# Patient Record
Sex: FEMALE | Race: WHITE | NOT HISPANIC OR LATINO | URBAN - METROPOLITAN AREA
[De-identification: names, ages, dates, MRNs, and addresses within clinical notes are randomized per-mention and may not be internally consistent; named-entity substitution may affect disease eponyms.]

---

## 2017-10-15 ENCOUNTER — OFFICE VISIT (OUTPATIENT)
Dept: URGENT CARE | Facility: CLINIC | Age: 52
End: 2017-10-15
Payer: COMMERCIAL

## 2017-10-15 ENCOUNTER — APPOINTMENT (OUTPATIENT)
Dept: RADIOLOGY | Facility: IMAGING CENTER | Age: 52
End: 2017-10-15
Attending: NURSE PRACTITIONER
Payer: COMMERCIAL

## 2017-10-15 VITALS
HEART RATE: 64 BPM | WEIGHT: 190 LBS | OXYGEN SATURATION: 97 % | TEMPERATURE: 98.5 F | RESPIRATION RATE: 16 BRPM | BODY MASS INDEX: 30.53 KG/M2 | SYSTOLIC BLOOD PRESSURE: 118 MMHG | DIASTOLIC BLOOD PRESSURE: 74 MMHG | HEIGHT: 66 IN

## 2017-10-15 DIAGNOSIS — M79.645 PAIN OF LEFT MIDDLE FINGER: ICD-10-CM

## 2017-10-15 DIAGNOSIS — L03.012 CELLULITIS OF FINGER OF LEFT HAND: ICD-10-CM

## 2017-10-15 PROCEDURE — 73140 X-RAY EXAM OF FINGER(S): CPT | Mod: TC,LT | Performed by: NURSE PRACTITIONER

## 2017-10-15 PROCEDURE — 99204 OFFICE O/P NEW MOD 45 MIN: CPT | Performed by: NURSE PRACTITIONER

## 2017-10-15 RX ORDER — DOXYCYCLINE HYCLATE 150 MG/1
TABLET ORAL
COMMUNITY
End: 2018-07-03

## 2017-10-15 RX ORDER — CLINDAMYCIN HYDROCHLORIDE 300 MG/1
300 CAPSULE ORAL 3 TIMES DAILY
Qty: 21 CAP | Refills: 0 | Status: SHIPPED | OUTPATIENT
Start: 2017-10-15 | End: 2017-10-22

## 2017-10-16 NOTE — PROGRESS NOTES
"Subjective:      Sofy Fay is a 52 y.o. female who presents with Wound Infection ((L) midde finger, pt states she is having pain in her hand and radiating up her arm into armpit)            This is a new problem. She states she smashed her left middle finger several months ago and had pain and swelling at that time. The was also broken at the time, scabbed over but never fully healed. Now she reports hitting her finger again recently and now the pain is worse. She also states she has pain that radiates up her left arm and her left arm pit feels tender. She feels body aches recently after hitting finger again and is worried it may be infected because of the way it looks. She has only tried Ibuprofen for the pain with little relief.        Review of Systems   Musculoskeletal: Positive for joint pain (left middle finger).   All other systems reviewed and are negative.    Past Medical History:   Diagnosis Date   • Obesity 8/8/2012   • History of cervical cancer 8/8/2012   • Anxiety 8/8/2012      Past Surgical History:   Procedure Laterality Date   • ABDOMINAL HYSTERECTOMY TOTAL        Social History     Social History   • Marital status:      Spouse name: N/A   • Number of children: N/A   • Years of education: N/A     Occupational History   • Not on file.     Social History Main Topics   • Smoking status: Never Smoker   • Smokeless tobacco: Never Used   • Alcohol use 1.8 oz/week     3 Shots of liquor per week   • Drug use: No   • Sexual activity: Yes     Partners: Male     Birth control/ protection: Surgical     Other Topics Concern   • Not on file     Social History Narrative   • No narrative on file          Objective:     /74   Pulse 64   Temp 36.9 °C (98.5 °F)   Resp 16   Ht 1.676 m (5' 6\")   Wt 86.2 kg (190 lb)   SpO2 97%   BMI 30.67 kg/m²      Physical Exam   Constitutional: She is oriented to person, place, and time. Vital signs are normal. She appears well-developed and " well-nourished.   HENT:   Head: Normocephalic and atraumatic.   Eyes: EOM are normal. Pupils are equal, round, and reactive to light.   Neck: Normal range of motion.   Cardiovascular: Normal rate and regular rhythm.    Pulmonary/Chest: Effort normal.   Musculoskeletal:        Left hand: She exhibits decreased range of motion and swelling.        Hands:  Neurological: She is alert and oriented to person, place, and time.   Skin: Skin is warm and dry. Capillary refill takes less than 2 seconds.   Psychiatric: She has a normal mood and affect. Her speech is normal and behavior is normal. Thought content normal.   Vitals reviewed.         Xray: no fracture or dislocation by my read. Radiology review pending.  10/15/2017 4:31 PM    HISTORY/REASON FOR EXAM:  Left 3rd digit pain after impaction injury 8 months ago.      TECHNIQUE/EXAM DESCRIPTION AND NUMBER OF VIEWS:  3 views of the LEFT fingers.    COMPARISON: None    FINDINGS:  No acute fracture, dislocation, or joint effusion is identified.     Impression       Negative left 3rd finger series.          Assessment/Plan:     1. Pain of left middle finger  - DX-FINGER(S) 2+ LEFT; Future    2. Cellulitis of finger of left hand  - clindamycin (CLEOCIN) 300 MG Cap; Take 1 Cap by mouth 3 times a day for 7 days.  Dispense: 21 Cap; Refill: 0    Will trial on a course of ABX to see if any improvement  The lymph node discomfort she is likely experiencing may be reactionary in nature  RTC if s/s do not improve  Strict ER precautions for fever, increasing redness/pain, or new drainage  Supportive care, differential diagnoses, and indications for immediate follow-up discussed with patient.    Pathogenesis of diagnosis discussed including typical length and natural progression.    Instructed to return to  or nearest emergency department if symptoms fail to improve, for any change in condition, further concerns, or new concerning symptoms.  Patient states understanding of the plan of  care and discharge instructions.

## 2017-11-03 ENCOUNTER — OFFICE VISIT (OUTPATIENT)
Dept: URGENT CARE | Facility: CLINIC | Age: 52
End: 2017-11-03
Payer: COMMERCIAL

## 2017-11-03 ENCOUNTER — APPOINTMENT (OUTPATIENT)
Dept: RADIOLOGY | Facility: IMAGING CENTER | Age: 52
End: 2017-11-03
Attending: NURSE PRACTITIONER
Payer: COMMERCIAL

## 2017-11-03 VITALS
WEIGHT: 211 LBS | OXYGEN SATURATION: 96 % | TEMPERATURE: 98 F | SYSTOLIC BLOOD PRESSURE: 110 MMHG | DIASTOLIC BLOOD PRESSURE: 80 MMHG | BODY MASS INDEX: 33.91 KG/M2 | RESPIRATION RATE: 16 BRPM | HEIGHT: 66 IN | HEART RATE: 71 BPM

## 2017-11-03 DIAGNOSIS — M25.561 ARTHRALGIA OF RIGHT KNEE: ICD-10-CM

## 2017-11-03 DIAGNOSIS — M25.561 RIGHT KNEE PAIN, UNSPECIFIED CHRONICITY: ICD-10-CM

## 2017-11-03 PROCEDURE — 99214 OFFICE O/P EST MOD 30 MIN: CPT | Performed by: NURSE PRACTITIONER

## 2017-11-03 PROCEDURE — 73562 X-RAY EXAM OF KNEE 3: CPT | Mod: TC,RT | Performed by: NURSE PRACTITIONER

## 2017-11-03 ASSESSMENT — ENCOUNTER SYMPTOMS: JOINT SWELLING: 1

## 2017-11-03 NOTE — PROGRESS NOTES
Subjective:      Sofy Fay is a 52 y.o. female who presents with Knee Pain (x1 week/ right knee/ tightness)            Knee Pain   This is a new problem. Episode onset: Pt reports she had an ACL repair with a cadaver ligament about 15 years ago. 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 She has noticed after her skit on the hard concrete surface seems hurt her knee. The problem occurs intermittently. The problem has been gradually worsening (Everyday that she has been performing in the play seems to make her knee tight and stiff in the evenings with an associated deep ache). Associated symptoms include joint swelling (right knee). Associated symptoms comments: She is worried her cadaver ligament may have failed because it has never bothered her before. The symptoms are aggravated by standing and walking. She has tried ice (elevation) for the symptoms. The treatment provided no relief.       Review of Systems   Musculoskeletal: Positive for joint pain (right knee) and joint swelling (right knee).   All other systems reviewed and are negative.    Past Medical History:   Diagnosis Date   • Anxiety 8/8/2012   • History of cervical cancer 8/8/2012   • Obesity 8/8/2012      Past Surgical History:   Procedure Laterality Date   • ABDOMINAL HYSTERECTOMY TOTAL        Social History     Social History   • Marital status:      Spouse name: N/A   • Number of children: N/A   • Years of education: N/A     Occupational History   • Not on file.     Social History Main Topics   • Smoking status: Never Smoker   • Smokeless tobacco: Never Used   • Alcohol use 1.8 oz/week     3 Shots of liquor per week   • Drug use: No   • Sexual activity: Yes     Partners: Male     Birth control/ protection: Surgical     Other Topics Concern   • Not on file     Social History Narrative   • No narrative on file          Objective:     /80   Pulse 71   Temp 36.7 °C (98 °F)   Resp 16   " Ht 1.676 m (5' 6\")   Wt 95.7 kg (211 lb)   SpO2 96%   BMI 34.06 kg/m²      Physical Exam   Constitutional: She is oriented to person, place, and time. Vital signs are normal. She appears well-developed and well-nourished.   HENT:   Head: Normocephalic and atraumatic.   Eyes: EOM are normal. Pupils are equal, round, and reactive to light.   Neck: Normal range of motion.   Cardiovascular: Normal rate and regular rhythm.    Pulmonary/Chest: Effort normal.   Musculoskeletal:        Right knee: She exhibits decreased range of motion and swelling. She exhibits no bony tenderness and normal meniscus. Tenderness found.        Legs:  Neurological: She is alert and oriented to person, place, and time.   Skin: Skin is warm and dry. Capillary refill takes less than 2 seconds.   Psychiatric: She has a normal mood and affect. Her speech is normal and behavior is normal. Thought content normal.   Vitals reviewed.         Xray: no fracture or dislocation by my read. Radiology review pending.  11/3/2017 2:23 PM    HISTORY/REASON FOR EXAM:  Atraumatic Pain/Swelling/Deformity.      TECHNIQUE/EXAM DESCRIPTION AND NUMBER OF VIEWS:  3 views of the  RIGHT knee.    COMPARISON: None    FINDINGS:    MINERALIZATION: Mineralization is unremarkable for age.    INJURY: No acute fracture or gross malalignment is seen.    MEDIAL JOINT COMPARTMENT: There is mild loss of joint space.  LATERAL JOINT COMPARTMENT: There is moderate loss of joint space.  PATELLOFEMORAL JOINT COMPARTMENT: There is mild loss of joint space.  There is a small knee joint effusion. There is a 12 x 11 x 16 mm partially calcified joint body in the suprapatellar joint recess.    There is ligamentous reconstruction hardware in the distal femur and proximal tibia.   Impression       1.  Tricompartmental osteoarthritis  2.  Prior ligamentous reconstruction  3.  Small knee joint effusion  4.  11 x 16 x 12 mm joint body in the suprapatellar recess          Assessment/Plan: "     1. Right knee pain, unspecified chronicity  - DX-KNEE 3 VIEWS RIGHT; Future    2. Arthralgia of right knee    Ordered hinged knee brace  Ibuprofen 600 mg TID  RICE  No high impact exercises  RTC in 1-2 weeks if knee has not improved. We discussed at that time she may benefit from an MRI  Supportive care, differential diagnoses, and indications for immediate follow-up discussed with patient.    Pathogenesis of diagnosis discussed including typical length and natural progression.      Instructed to return to  or nearest emergency department if symptoms fail to improve, for any change in condition, further concerns, or new concerning symptoms.  Patient states understanding of the plan of care and discharge instructions.

## 2017-11-28 ENCOUNTER — TELEPHONE (OUTPATIENT)
Dept: URGENT CARE | Facility: CLINIC | Age: 52
End: 2017-11-28

## 2017-11-29 ENCOUNTER — TELEPHONE (OUTPATIENT)
Dept: URGENT CARE | Facility: CLINIC | Age: 52
End: 2017-11-29

## 2017-11-29 DIAGNOSIS — M25.561 RIGHT KNEE PAIN, UNSPECIFIED CHRONICITY: ICD-10-CM

## 2018-07-03 ENCOUNTER — OFFICE VISIT (OUTPATIENT)
Dept: MEDICAL GROUP | Facility: MEDICAL CENTER | Age: 53
End: 2018-07-03

## 2018-07-03 VITALS
WEIGHT: 194 LBS | HEIGHT: 66 IN | HEART RATE: 62 BPM | TEMPERATURE: 98.6 F | DIASTOLIC BLOOD PRESSURE: 82 MMHG | OXYGEN SATURATION: 97 % | SYSTOLIC BLOOD PRESSURE: 116 MMHG | BODY MASS INDEX: 31.18 KG/M2 | RESPIRATION RATE: 16 BRPM

## 2018-07-03 DIAGNOSIS — E87.6 HYPOKALEMIA: ICD-10-CM

## 2018-07-03 DIAGNOSIS — R00.2 PALPITATIONS: ICD-10-CM

## 2018-07-03 DIAGNOSIS — R55 SYNCOPE, UNSPECIFIED SYNCOPE TYPE: ICD-10-CM

## 2018-07-03 DIAGNOSIS — R00.1 BRADYCARDIA: ICD-10-CM

## 2018-07-03 DIAGNOSIS — Z86.73 HISTORY OF LACUNAR CEREBROVASCULAR ACCIDENT: ICD-10-CM

## 2018-07-03 DIAGNOSIS — E03.9 ACQUIRED HYPOTHYROIDISM: ICD-10-CM

## 2018-07-03 PROCEDURE — 99214 OFFICE O/P EST MOD 30 MIN: CPT | Performed by: INTERNAL MEDICINE

## 2018-07-03 NOTE — PROGRESS NOTES
"CC: Follow-up hospitalization syncope, findings of a distant stroke.    HPI:   Sofy presents today with the following.    1. History of lacunar cerebrovascular accident/ Palpitations/ Syncope, unspecified syncope type  Presents after going to the hospital for acute loss of consciousness with difficulty speaking.  While in the hospital she did have evidence of a lacunar infarct but it did not appear to be acute.  She was doing a lot of work in the heat and passed out again paramedics were called and taken the hospital.  Her speech regained relatively quickly.  Unclear if syncope or TIA like symptoms.  She reports echocardiogram and carotids were normal at the outside hospital.  She was not started on medications.  She denies any chest pain but exploring symptoms she is found to be bradycardic frequently and does report intermittent palpitations lasting for several minutes.  She denies having any of the sensations prior to her recent event.  She was found to be hypokalemic in the hospital as well.  Denying any chest pains or palpitations with an office today.  She is currently without insurance.          Patient Active Problem List    Diagnosis Date Noted   • History of lacunar cerebrovascular accident 07/03/2018   • Bradycardia 07/03/2018   • Acquired hypothyroidism 11/15/2016   • History of cervical cancer 08/08/2012   • Anxiety 08/08/2012       No current outpatient prescriptions on file.     No current facility-administered medications for this visit.          Allergies as of 07/03/2018 - Reviewed 07/03/2018   Allergen Reaction Noted   • Erythromycin Nausea 07/27/2012   • Latex  08/08/2012        ROS: Denies Chest pain, SOB, LE edema.    /82   Pulse 62   Temp 37 °C (98.6 °F)   Resp 16   Ht 1.676 m (5' 6\")   Wt 88 kg (194 lb)   SpO2 97%   BMI 31.31 kg/m²     Physical Exam:  Gen:         Alert and oriented, No apparent distress.  Neck:        No Lymphadenopathy or Bruits.  Lungs:     Clear to " auscultation bilaterally  CV:          Regular rate and rhythm. No murmurs, rubs or gallops.               Ext:          No clubbing, cyanosis, edema.      Assessment and Plan.   53 y.o. female with the following issues.    1. History of lacunar cerebrovascular accident/ Palpitations/ Syncope, unspecified syncope type  No obvious residual defects unclear if this was stroke versus TIA versus syncope.  Given the palpitations and the lacunar infarct certainly question embolic source.  Have referred to cardiology she will work on getting insurance and may consider an event monitor.  Have recommended taking a full-strength aspirin daily.  Sending for labs repeat electrolytes as well as a TSH as she has been on thyroid medications in the past currently not taking.

## 2019-02-14 ENCOUNTER — HOSPITAL ENCOUNTER (OUTPATIENT)
Dept: LAB | Facility: MEDICAL CENTER | Age: 54
End: 2019-02-14
Attending: INTERNAL MEDICINE
Payer: COMMERCIAL

## 2019-02-14 ENCOUNTER — OFFICE VISIT (OUTPATIENT)
Dept: MEDICAL GROUP | Facility: MEDICAL CENTER | Age: 54
End: 2019-02-14
Payer: COMMERCIAL

## 2019-02-14 VITALS
WEIGHT: 204 LBS | DIASTOLIC BLOOD PRESSURE: 82 MMHG | HEIGHT: 65 IN | SYSTOLIC BLOOD PRESSURE: 112 MMHG | HEART RATE: 71 BPM | BODY MASS INDEX: 33.99 KG/M2 | TEMPERATURE: 97.6 F | OXYGEN SATURATION: 94 %

## 2019-02-14 DIAGNOSIS — Z12.11 SCREEN FOR COLON CANCER: ICD-10-CM

## 2019-02-14 DIAGNOSIS — Z12.31 VISIT FOR SCREENING MAMMOGRAM: ICD-10-CM

## 2019-02-14 DIAGNOSIS — E87.6 HYPOKALEMIA: ICD-10-CM

## 2019-02-14 DIAGNOSIS — F33.1 MODERATE EPISODE OF RECURRENT MAJOR DEPRESSIVE DISORDER (HCC): ICD-10-CM

## 2019-02-14 DIAGNOSIS — M25.521 RIGHT ELBOW PAIN: ICD-10-CM

## 2019-02-14 DIAGNOSIS — E03.9 ACQUIRED HYPOTHYROIDISM: ICD-10-CM

## 2019-02-14 LAB
ALBUMIN SERPL BCP-MCNC: 5.1 G/DL (ref 3.2–4.9)
ALBUMIN/GLOB SERPL: 2.2 G/DL
ALP SERPL-CCNC: 48 U/L (ref 30–99)
ALT SERPL-CCNC: 22 U/L (ref 2–50)
ANION GAP SERPL CALC-SCNC: 11 MMOL/L (ref 0–11.9)
AST SERPL-CCNC: 15 U/L (ref 12–45)
BILIRUB SERPL-MCNC: 0.8 MG/DL (ref 0.1–1.5)
BUN SERPL-MCNC: 20 MG/DL (ref 8–22)
CALCIUM SERPL-MCNC: 9.8 MG/DL (ref 8.5–10.5)
CHLORIDE SERPL-SCNC: 107 MMOL/L (ref 96–112)
CO2 SERPL-SCNC: 26 MMOL/L (ref 20–33)
CREAT SERPL-MCNC: 0.83 MG/DL (ref 0.5–1.4)
GLOBULIN SER CALC-MCNC: 2.3 G/DL (ref 1.9–3.5)
GLUCOSE SERPL-MCNC: 83 MG/DL (ref 65–99)
POTASSIUM SERPL-SCNC: 3.7 MMOL/L (ref 3.6–5.5)
PROT SERPL-MCNC: 7.4 G/DL (ref 6–8.2)
SODIUM SERPL-SCNC: 144 MMOL/L (ref 135–145)
TSH SERPL DL<=0.005 MIU/L-ACNC: 4.04 UIU/ML (ref 0.38–5.33)

## 2019-02-14 PROCEDURE — 84443 ASSAY THYROID STIM HORMONE: CPT

## 2019-02-14 PROCEDURE — 99214 OFFICE O/P EST MOD 30 MIN: CPT | Performed by: INTERNAL MEDICINE

## 2019-02-14 PROCEDURE — 36415 COLL VENOUS BLD VENIPUNCTURE: CPT

## 2019-02-14 PROCEDURE — 80053 COMPREHEN METABOLIC PANEL: CPT

## 2019-02-14 RX ORDER — CITALOPRAM 40 MG/1
40 TABLET ORAL DAILY
Qty: 90 TAB | Refills: 3 | Status: SHIPPED | OUTPATIENT
Start: 2019-02-14 | End: 2019-03-20

## 2019-02-14 NOTE — PROGRESS NOTES
"CC: Depression, elbow pain.    HPI:   Sofy presents today with the following.    1. Moderate episode of recurrent major depressive disorder (HCC)  Presents with memory concerns in the setting of a previous stroke but also reporting depressive symptoms.  Lack of concentration as well as motivation.  She was sent for blood work but is not yet completed it.  She does report some dark thoughts but not a risk for suicide with a specific plan.  Her  is aware of her mood.    2. Right elbow pain  Does complain of right elbow pain 4 out of 10 intensity worse with .  She is using topical anti-inflammatory          Patient Active Problem List    Diagnosis Date Noted   • Moderate episode of recurrent major depressive disorder (HCC) 02/14/2019   • History of lacunar cerebrovascular accident 07/03/2018   • Bradycardia 07/03/2018   • History of cervical cancer 08/08/2012   • Anxiety 08/08/2012       Current Outpatient Prescriptions   Medication Sig Dispense Refill   • Diclofenac Sodium 1 % Gel Apply 1 g to skin as directed 4 times a day. 3 Tube 2   • citalopram (CELEXA) 40 MG Tab Take 1 Tab by mouth every day. 90 Tab 3     No current facility-administered medications for this visit.          Allergies as of 02/14/2019 - Reviewed 02/14/2019   Allergen Reaction Noted   • Erythromycin Nausea 07/27/2012   • Latex  08/08/2012        ROS: Denies Chest pain, SOB, LE edema.    /82 (BP Location: Left arm, Patient Position: Sitting)   Pulse 71   Temp 36.4 °C (97.6 °F)   Ht 1.651 m (5' 5\")   Wt 92.5 kg (204 lb)   SpO2 94%   BMI 33.95 kg/m²     Physical Exam:  Gen:         Alert and oriented, No apparent distress.  Neck:        No Lymphadenopathy or Bruits.  Lungs:     Clear to auscultation bilaterally  CV:          Regular rate and rhythm. No murmurs, rubs or gallops.               Ext:          No clubbing, cyanosis, edema.      Assessment and Plan.   54 y.o. female with the following issues.    1. Moderate episode of " recurrent major depressive disorder (HCC)  Discussion about medication starting on citalopram 20 mg daily titrated up to 40.  Will see back in 4-6 weeks.  - citalopram (CELEXA) 40 MG Tab; Take 1 Tab by mouth every day.  Dispense: 90 Tab; Refill: 3    2. Right elbow pain  Have recommended topical anti-inflammatories and brace sling sent prescription to pharmacy if not improving consider injections.  - Diclofenac Sodium 1 % Gel; Apply 1 g to skin as directed 4 times a day.  Dispense: 3 Tube; Refill: 2    3. Screen for colon cancer    - OCCULT BLOOD FECES IMMUNOASSAY; Future    4. Visit for screening mammogram    - MA-SCREEN MAMMO W/CAD-BILAT; Future

## 2019-03-20 ENCOUNTER — OFFICE VISIT (OUTPATIENT)
Dept: MEDICAL GROUP | Facility: MEDICAL CENTER | Age: 54
End: 2019-03-20
Payer: COMMERCIAL

## 2019-03-20 VITALS
DIASTOLIC BLOOD PRESSURE: 82 MMHG | TEMPERATURE: 97.7 F | SYSTOLIC BLOOD PRESSURE: 112 MMHG | WEIGHT: 209 LBS | HEIGHT: 65 IN | BODY MASS INDEX: 34.82 KG/M2 | HEART RATE: 64 BPM | OXYGEN SATURATION: 96 %

## 2019-03-20 DIAGNOSIS — G89.29 CHRONIC PAIN OF BOTH KNEES: ICD-10-CM

## 2019-03-20 DIAGNOSIS — M25.562 CHRONIC PAIN OF BOTH KNEES: ICD-10-CM

## 2019-03-20 DIAGNOSIS — M25.561 CHRONIC PAIN OF BOTH KNEES: ICD-10-CM

## 2019-03-20 DIAGNOSIS — F33.1 MODERATE EPISODE OF RECURRENT MAJOR DEPRESSIVE DISORDER (HCC): ICD-10-CM

## 2019-03-20 PROCEDURE — 99213 OFFICE O/P EST LOW 20 MIN: CPT | Performed by: INTERNAL MEDICINE

## 2019-03-20 RX ORDER — BUPROPION HYDROCHLORIDE 100 MG/1
100 TABLET ORAL 2 TIMES DAILY
Qty: 60 TAB | Refills: 6 | Status: SHIPPED | OUTPATIENT
Start: 2019-03-20 | End: 2019-03-20 | Stop reason: SDUPTHER

## 2019-03-20 RX ORDER — BUPROPION HYDROCHLORIDE 100 MG/1
100 TABLET ORAL 2 TIMES DAILY
Qty: 60 TAB | Refills: 6 | Status: SHIPPED | OUTPATIENT
Start: 2019-03-20 | End: 2019-04-01 | Stop reason: SDUPTHER

## 2019-03-20 RX ORDER — DOXYCYCLINE HYCLATE 100 MG
100 TABLET ORAL DAILY
Qty: 10 TAB | Refills: 2 | Status: SHIPPED | OUTPATIENT
Start: 2019-03-20 | End: 2019-07-02 | Stop reason: SDUPTHER

## 2019-03-20 NOTE — PROGRESS NOTES
"CC: Follow-up depression, arthritis of the knees.    HPI:   Sofy presents today with the following.    1. Moderate episode of recurrent major depressive disorder (HCC)  Presents has not taking the medication that was prescribed at last visit for fears of making her emotionally dead as well as concern for weight gain.  She is still having similar mood issues.  She is wanting to try something but is somewhat nervous about medications.    2. Chronic pain of both knees  She is having persistent chronic bilateral knee pain that does plan to her mood.  She is wanting to get back to her previous orthopedist.      Patient Active Problem List    Diagnosis Date Noted   • Moderate episode of recurrent major depressive disorder (HCC) 02/14/2019   • History of lacunar cerebrovascular accident 07/03/2018   • Bradycardia 07/03/2018   • History of cervical cancer 08/08/2012   • Anxiety 08/08/2012       Current Outpatient Prescriptions   Medication Sig Dispense Refill   • buPROPion (WELLBUTRIN) 100 MG Tab Take 1 Tab by mouth 2 times a day. 60 Tab 6   • doxycycline (VIBRAMYCIN) 100 MG Tab Take 1 Tab by mouth every day. 10 Tab 2   • Diclofenac Sodium 1 % Gel Apply 1 g to skin as directed 4 times a day. (Patient not taking: Reported on 3/20/2019) 3 Tube 2     No current facility-administered medications for this visit.          Allergies as of 03/20/2019 - Reviewed 03/20/2019   Allergen Reaction Noted   • Erythromycin Nausea 07/27/2012   • Latex  08/08/2012        ROS: Denies Chest pain, SOB, LE edema.    /82 (BP Location: Left arm, Patient Position: Sitting)   Pulse 64   Temp 36.5 °C (97.7 °F)   Ht 1.651 m (5' 5\")   Wt 94.8 kg (209 lb)   SpO2 96%   BMI 34.78 kg/m²     Physical Exam:  Gen:         Alert and oriented, No apparent distress.  Neck:        No Lymphadenopathy or Bruits.  Lungs:     Clear to auscultation bilaterally  CV:          Regular rate and rhythm. No murmurs, rubs or gallops.               Ext:          No " clubbing, cyanosis, edema.      Assessment and Plan.   54 y.o. female with the following issues.    1. Moderate episode of recurrent major depressive disorder (HCC)  Switching to Wellbutrin to hopefully have less and weight gain effects cautioned about side effects.  - buPROPion (WELLBUTRIN) 100 MG Tab; Take 1 Tab by mouth 2 times a day.  Dispense: 60 Tab; Refill: 6    2. Chronic pain of both knees  Referred to orthopedics.  - REFERRAL TO ORTHOPEDICS

## 2019-04-01 DIAGNOSIS — F33.1 MODERATE EPISODE OF RECURRENT MAJOR DEPRESSIVE DISORDER (HCC): ICD-10-CM

## 2019-04-01 DIAGNOSIS — M25.521 RIGHT ELBOW PAIN: ICD-10-CM

## 2019-04-01 RX ORDER — BUPROPION HYDROCHLORIDE 100 MG/1
100 TABLET ORAL 2 TIMES DAILY
Qty: 60 TAB | Refills: 6 | Status: SHIPPED | OUTPATIENT
Start: 2019-04-01 | End: 2019-10-10

## 2019-04-01 NOTE — TELEPHONE ENCOUNTER
Was the patient seen in the last year in this department? Yes    Does patient have an active prescription for medications requested? Yes    Received Request Via: Patient       Lost Rx for one and insurance not accepted at [facility]. Changing pharmacy.

## 2019-07-02 RX ORDER — DOXYCYCLINE HYCLATE 100 MG
100 TABLET ORAL DAILY
Qty: 10 TAB | Refills: 2 | Status: SHIPPED
Start: 2019-07-02 | End: 2019-07-24 | Stop reason: SDUPTHER

## 2019-07-02 NOTE — TELEPHONE ENCOUNTER
PLEASE SEND PRESCRIPTION TO: [facility].    THE PHARMACY ON FILE IS WRONG AND SO IS THE FAX NUMBER     Please fax to:  Fax: [phone]    Phone number:   [phone]    PATIENT NEVER GOT PRESCRIPTION BECAUSE IT WAS SENT TO THE WRONG PLACE

## 2019-10-10 ENCOUNTER — OFFICE VISIT (OUTPATIENT)
Dept: MEDICAL GROUP | Facility: MEDICAL CENTER | Age: 54
End: 2019-10-10

## 2019-10-10 VITALS
BODY MASS INDEX: 34.23 KG/M2 | OXYGEN SATURATION: 95 % | HEIGHT: 66 IN | WEIGHT: 213 LBS | TEMPERATURE: 98.3 F | HEART RATE: 84 BPM | SYSTOLIC BLOOD PRESSURE: 114 MMHG | DIASTOLIC BLOOD PRESSURE: 70 MMHG

## 2019-10-10 DIAGNOSIS — Z12.11 SCREEN FOR COLON CANCER: ICD-10-CM

## 2019-10-10 DIAGNOSIS — M79.89 LEG SWELLING: ICD-10-CM

## 2019-10-10 PROCEDURE — 99213 OFFICE O/P EST LOW 20 MIN: CPT | Performed by: INTERNAL MEDICINE

## 2019-10-10 ASSESSMENT — PATIENT HEALTH QUESTIONNAIRE - PHQ9
4. FEELING TIRED OR HAVING LITTLE ENERGY: SEVERAL DAYS
2. FEELING DOWN, DEPRESSED, IRRITABLE, OR HOPELESS: NOT AT ALL
1. LITTLE INTEREST OR PLEASURE IN DOING THINGS: NOT AT ALL
SUM OF ALL RESPONSES TO PHQ9 QUESTIONS 1 AND 2: 0
3. TROUBLE FALLING OR STAYING ASLEEP OR SLEEPING TOO MUCH: SEVERAL DAYS

## 2019-10-10 NOTE — PROGRESS NOTES
"      CC: Leg swelling                                                                                                                                      HPI:   Sofy presents today with the following.    1. Leg swelling  Presents complaining of leg swelling left greater than right.  Denies any chest pain or shortness of breath no hemoptysis.  Does get worse at the end the day and improved slightly in the mornings.  Right leg swells minimally.  She does report some mild tenderness in the back of her knee.  Denying any other complaints today.    2. Screen for colon cancer        Patient Active Problem List    Diagnosis Date Noted   • Moderate episode of recurrent major depressive disorder (HCC) 02/14/2019   • History of lacunar cerebrovascular accident 07/03/2018   • Bradycardia 07/03/2018   • History of cervical cancer 08/08/2012   • Anxiety 08/08/2012       Current Outpatient Medications   Medication Sig Dispense Refill   • Diclofenac Sodium 1 % Gel Apply 1 g to skin as directed 4 times a day. 3 Tube 2     No current facility-administered medications for this visit.          Allergies as of 10/10/2019 - Reviewed 10/10/2019   Allergen Reaction Noted   • Erythromycin Nausea 07/27/2012   • Latex  08/08/2012        ROS: Denies Chest pain, SOB, changes to urine    /70 (BP Location: Right arm, Patient Position: Sitting, BP Cuff Size: Adult)   Pulse 84   Temp 36.8 °C (98.3 °F) (Temporal)   Ht 1.666 m (5' 5.6\")   Wt 96.6 kg (213 lb)   SpO2 95%   BMI 34.80 kg/m²     Physical Exam:  Gen:         Alert and oriented, No apparent distress.  Neck:        No Lymphadenopathy or Bruits.  Lungs:     Clear to auscultation bilaterally  CV:          Regular rate and rhythm. No murmurs, rubs or gallops.               Ext:          No clubbing, cyanosis, 1+ left-sided edema.      Assessment and Plan.   54 y.o. female with the following issues.    1. Leg swelling  Given its unilateral written for ultrasound suspect a possible " ruptured Baker's cyst.  Recommending compression stockings  - US-EXTREMITY VENOUS LOWER UNILAT LEFT; Future    2. Screen for colon cancer    - OCCULT BLOOD FECES IMMUNOASSAY; Future

## 2020-03-31 ENCOUNTER — OFFICE VISIT (OUTPATIENT)
Dept: MEDICAL GROUP | Facility: MEDICAL CENTER | Age: 55
End: 2020-03-31
Payer: COMMERCIAL

## 2020-03-31 ENCOUNTER — HOSPITAL ENCOUNTER (OUTPATIENT)
Dept: LAB | Facility: MEDICAL CENTER | Age: 55
End: 2020-03-31
Attending: INTERNAL MEDICINE
Payer: COMMERCIAL

## 2020-03-31 VITALS
TEMPERATURE: 97.7 F | SYSTOLIC BLOOD PRESSURE: 126 MMHG | DIASTOLIC BLOOD PRESSURE: 62 MMHG | HEIGHT: 66 IN | HEART RATE: 69 BPM | WEIGHT: 217 LBS | OXYGEN SATURATION: 94 % | BODY MASS INDEX: 34.87 KG/M2

## 2020-03-31 DIAGNOSIS — Z13.6 SCREENING FOR CARDIOVASCULAR CONDITION: ICD-10-CM

## 2020-03-31 DIAGNOSIS — R23.2 HOT FLASHES: ICD-10-CM

## 2020-03-31 DIAGNOSIS — Z12.11 SCREEN FOR COLON CANCER: ICD-10-CM

## 2020-03-31 DIAGNOSIS — Z12.31 VISIT FOR SCREENING MAMMOGRAM: ICD-10-CM

## 2020-03-31 DIAGNOSIS — R21 RASH: ICD-10-CM

## 2020-03-31 PROCEDURE — 99213 OFFICE O/P EST LOW 20 MIN: CPT | Performed by: INTERNAL MEDICINE

## 2020-03-31 ASSESSMENT — PATIENT HEALTH QUESTIONNAIRE - PHQ9
2. FEELING DOWN, DEPRESSED, IRRITABLE, OR HOPELESS: SEVERAL DAYS
4. FEELING TIRED OR HAVING LITTLE ENERGY: NEARLY EVERY DAY
5. POOR APPETITE OR OVEREATING: MORE THAN HALF THE DAYS
9. THOUGHTS THAT YOU WOULD BE BETTER OFF DEAD, OR OF HURTING YOURSELF: NOT AT ALL
8. MOVING OR SPEAKING SO SLOWLY THAT OTHER PEOPLE COULD HAVE NOTICED. OR THE OPPOSITE, BEING SO FIGETY OR RESTLESS THAT YOU HAVE BEEN MOVING AROUND A LOT MORE THAN USUAL: NOT AT ALL
1. LITTLE INTEREST OR PLEASURE IN DOING THINGS: SEVERAL DAYS
6. FEELING BAD ABOUT YOURSELF - OR THAT YOU ARE A FAILURE OR HAVE LET YOURSELF OR YOUR FAMILY DOWN: SEVERAL DAYS
SUM OF ALL RESPONSES TO PHQ QUESTIONS 1-9: 13
3. TROUBLE FALLING OR STAYING ASLEEP OR SLEEPING TOO MUCH: NEARLY EVERY DAY
7. TROUBLE CONCENTRATING ON THINGS, SUCH AS READING THE NEWSPAPER OR WATCHING TELEVISION: MORE THAN HALF THE DAYS
SUM OF ALL RESPONSES TO PHQ9 QUESTIONS 1 AND 2: 2

## 2020-03-31 NOTE — PROGRESS NOTES
"      CC: Rash                                                                                                                                      HPI:   Sofy presents today with the following.    1. Rash  Presenting complaining of a rash spread out her body.  She had on her left hip on her chest as well as on her toe on her hand.  All signs of the rash seem to be clearing up over the last week.  No fevers or chills no spreading redness no other associated symptoms.        Patient Active Problem List    Diagnosis Date Noted   • Moderate episode of recurrent major depressive disorder (HCC) 02/14/2019   • History of lacunar cerebrovascular accident 07/03/2018   • Bradycardia 07/03/2018   • History of cervical cancer 08/08/2012   • Anxiety 08/08/2012       Current Outpatient Medications   Medication Sig Dispense Refill   • Diclofenac Sodium 1 % Gel Apply 1 g to skin as directed 4 times a day. 3 Tube 2     No current facility-administered medications for this visit.          Allergies as of 03/31/2020 - Reviewed 03/31/2020   Allergen Reaction Noted   • Erythromycin Nausea 07/27/2012   • Latex  08/08/2012        ROS: Denies Chest pain, SOB, LE edema.    /62 (BP Location: Right arm, Patient Position: Sitting)   Pulse 69   Temp 36.5 °C (97.7 °F)   Ht 1.666 m (5' 5.6\")   Wt 98.4 kg (217 lb)   SpO2 94%   BMI 35.45 kg/m²     Physical Exam:  Gen:         Alert and oriented, No apparent distress.  Neck:        No Lymphadenopathy or Bruits.  Lungs:     Clear to auscultation bilaterally  CV:          Regular rate and rhythm. No murmurs, rubs or gallops.               Ext:          No clubbing, cyanosis, edema.      Assessment and Plan.   55 y.o. female with the following issues.    1. Rash  Seems to be post viral in nature given affecting hands and feet certainly virus comes to mind.  No other symptomology will manage expectantly.    2. Screening for cardiovascular condition      3. Visit for screening mammogram    - " MA-SCREENING MAMMO BILAT W/CAD; Future    4. Screen for colon cancer    - COLOGUARD COLON CANCER SCREENING

## 2020-04-06 ENCOUNTER — OFFICE VISIT (OUTPATIENT)
Dept: MEDICAL GROUP | Facility: MEDICAL CENTER | Age: 55
End: 2020-04-06
Payer: COMMERCIAL

## 2020-04-06 VITALS
DIASTOLIC BLOOD PRESSURE: 70 MMHG | HEIGHT: 65 IN | SYSTOLIC BLOOD PRESSURE: 120 MMHG | WEIGHT: 211 LBS | BODY MASS INDEX: 35.16 KG/M2 | HEART RATE: 78 BPM | RESPIRATION RATE: 12 BRPM

## 2020-04-06 DIAGNOSIS — E03.9 ACQUIRED HYPOTHYROIDISM: ICD-10-CM

## 2020-04-06 PROCEDURE — 99213 OFFICE O/P EST LOW 20 MIN: CPT | Performed by: INTERNAL MEDICINE

## 2020-04-06 RX ORDER — LEVOTHYROXINE SODIUM 0.12 MG/1
125 TABLET ORAL
Qty: 90 TAB | Refills: 3 | Status: SHIPPED | OUTPATIENT
Start: 2020-04-06 | End: 2020-04-06 | Stop reason: SDUPTHER

## 2020-04-06 RX ORDER — LEVOTHYROXINE SODIUM 0.12 MG/1
125 TABLET ORAL
Qty: 90 TAB | Refills: 3 | Status: SHIPPED | OUTPATIENT
Start: 2020-04-06 | End: 2020-08-19

## 2020-04-06 NOTE — PROGRESS NOTES
"      CC: Follow-up blood work and hypothyroidism                                                                                                                                      HPI:   Sofy presents today with the following.    1. Acquired hypothyroidism  Presents after having blood work with concerns for weight gain TSH comes back at 9.  Never been on medication.  Denying excessive hair skin changes she is reporting fatigue.  No other complaints.      Patient Active Problem List    Diagnosis Date Noted   • Moderate episode of recurrent major depressive disorder (HCC) 02/14/2019   • History of lacunar cerebrovascular accident 07/03/2018   • Bradycardia 07/03/2018   • Acquired hypothyroidism 11/15/2016   • History of cervical cancer 08/08/2012   • Anxiety 08/08/2012       Current Outpatient Medications   Medication Sig Dispense Refill   • levothyroxine (SYNTHROID) 125 MCG Tab Take 1 Tab by mouth Every morning on an empty stomach. 90 Tab 3   • Diclofenac Sodium 1 % Gel Apply 1 g to skin as directed 4 times a day. 3 Tube 2     No current facility-administered medications for this visit.          Allergies as of 04/06/2020 - Reviewed 04/06/2020   Allergen Reaction Noted   • Erythromycin Nausea 07/27/2012   • Latex  08/08/2012        /70   Pulse 78   Resp 12   Ht 1.651 m (5' 5\")   Wt 95.7 kg (211 lb)   BMI 35.11 kg/m²       /70   Pulse 78   Resp 12   Ht 1.651 m (5' 5\")   Wt 95.7 kg (211 lb)   BMI 35.11 kg/m²     Physical Exam:  Gen:         Alert and oriented, No apparent distress.  Neck:        No Lymphadenopathy or Bruits.  Lungs:     Clear to auscultation bilaterally  CV:          Regular rate and rhythm. No murmurs, rubs or gallops.               Ext:          No clubbing, cyanosis, edema.      Assessment and Plan.   55 y.o. female with the following issues.    1. Acquired hypothyroidism  New diagnosis of hypothyroidism starting on levothyroxine 125 mcg daily recheck labs in 2 months.  - " TSH; Future

## 2020-04-13 RX ORDER — METHYLPREDNISOLONE 4 MG/1
TABLET ORAL
Qty: 21 TAB | Refills: 0 | Status: SHIPPED | OUTPATIENT
Start: 2020-04-13 | End: 2020-08-19

## 2020-05-26 NOTE — PROGRESS NOTES
"This encounter was conducted via Zoom meeting  Verbal consent was obtained. Patient's identity was verified.         1. Acquired hypothyroidism  Presents after having blood work with concerns for weight gain TSH comes back at 9.  Never been on medication.  Denying excessive hair skin changes she is reporting fatigue.  No other complaints.        Patient Active Problem List    Diagnosis Date Noted   • Moderate episode of recurrent major depressive disorder (HCC) 02/14/2019   • History of lacunar cerebrovascular accident 07/03/2018   • Bradycardia 07/03/2018   • Acquired hypothyroidism 11/15/2016   • History of cervical cancer 08/08/2012   • Anxiety 08/08/2012       Current Outpatient Medications   Medication Sig Dispense Refill   • levothyroxine (SYNTHROID) 125 MCG Tab Take 1 Tab by mouth Every morning on an empty stomach. 90 Tab 3   • methylPREDNISolone (MEDROL DOSEPAK) 4 MG Tablet Therapy Pack Per package insert. 21 Tab 0   • Diclofenac Sodium 1 % Gel Apply 1 g to skin as directed 4 times a day. 3 Tube 2     No current facility-administered medications for this visit.          Allergies as of 04/06/2020 - Reviewed 04/06/2020   Allergen Reaction Noted   • Erythromycin Nausea 07/27/2012   • Latex  08/08/2012        ROS:  Denies, chest pain, Shortness of breath, Edema.     /70   Pulse 78   Resp 12   Ht 1.651 m (5' 5\")   Wt 95.7 kg (211 lb)   BMI 35.11 kg/m²       Physical Exam:  Constitutional: Alert, no distress, well-groomed.  Skin: No rashes in visible areas.  Eye: Round. Conjunctiva clear, lNo icterus.   ENMT: Lips pink without lesions, good dentition, moist mucous membranes. Phonation normal.  Neck: No masses, no thyromegaly. Moves freely without pain.  CV: Pulse as reported by patient  Respiratory: Unlabored respiratory effort, no cough or audible wheeze  Psych: Alert and oriented x3, normal affect and mood.          Assessment and Plan.   55 y.o. female with the following issues.    1. Acquired " hypothyroidism  New diagnosis of hypothyroidism starting on levothyroxine 125 mcg daily recheck labs in 2 months.  - TSH; Future

## 2020-08-19 ENCOUNTER — HOSPITAL ENCOUNTER (OUTPATIENT)
Dept: CARDIOLOGY | Facility: MEDICAL CENTER | Age: 55
End: 2020-08-19
Attending: INTERNAL MEDICINE
Payer: COMMERCIAL

## 2020-08-19 DIAGNOSIS — R60.0 LEG EDEMA: ICD-10-CM

## 2020-08-19 LAB
LV EJECT FRACT  99904: 60
LV EJECT FRACT MOD 2C 99903: 64.49
LV EJECT FRACT MOD 4C 99902: 59.37
LV EJECT FRACT MOD BP 99901: 57.9

## 2020-08-19 PROCEDURE — 93306 TTE W/DOPPLER COMPLETE: CPT

## 2020-08-19 PROCEDURE — 93306 TTE W/DOPPLER COMPLETE: CPT | Mod: 26 | Performed by: INTERNAL MEDICINE

## 2020-09-29 ENCOUNTER — OFFICE VISIT (OUTPATIENT)
Dept: URGENT CARE | Facility: CLINIC | Age: 55
End: 2020-09-29
Payer: COMMERCIAL

## 2020-09-29 VITALS
WEIGHT: 220 LBS | OXYGEN SATURATION: 96 % | BODY MASS INDEX: 34.53 KG/M2 | HEART RATE: 68 BPM | TEMPERATURE: 98.2 F | HEIGHT: 67 IN | SYSTOLIC BLOOD PRESSURE: 124 MMHG | DIASTOLIC BLOOD PRESSURE: 82 MMHG

## 2020-09-29 DIAGNOSIS — Z91.038 ALLERGIC REACTION TO INSECT BITE: ICD-10-CM

## 2020-09-29 PROCEDURE — 99214 OFFICE O/P EST MOD 30 MIN: CPT | Mod: 25 | Performed by: NURSE PRACTITIONER

## 2020-09-29 RX ORDER — CEPHALEXIN 500 MG/1
500 CAPSULE ORAL 3 TIMES DAILY
Qty: 15 CAP | Refills: 0 | Status: SHIPPED | OUTPATIENT
Start: 2020-09-29 | End: 2020-10-04

## 2020-09-29 RX ORDER — DEXAMETHASONE SODIUM PHOSPHATE 10 MG/ML
10 INJECTION INTRAMUSCULAR; INTRAVENOUS ONCE
Status: COMPLETED | OUTPATIENT
Start: 2020-09-29 | End: 2020-09-29

## 2020-09-29 RX ADMIN — DEXAMETHASONE SODIUM PHOSPHATE 10 MG: 10 INJECTION INTRAMUSCULAR; INTRAVENOUS at 16:54

## 2020-09-29 ASSESSMENT — ENCOUNTER SYMPTOMS
SHORTNESS OF BREATH: 0
DIZZINESS: 0
NAUSEA: 0
SORE THROAT: 0
FEVER: 0
EYE REDNESS: 0
VOMITING: 0
CHILLS: 0
MYALGIAS: 0

## 2020-09-29 NOTE — PROGRESS NOTES
"Subjective:   Sofy Fay  is a 55 y.o. female who presents for Insect Bite (got bit on right arm, possible allergic reaction, noticed it this morning)  Patient is a 55-year-old female presents clinic today for evaluation of allergic reaction that occurred due to an insect bite of the right arm earlier this morning.  Patient states that she noticed the redness this morning which she marked the redness with a marker and took half of Benadryl however throughout the day the redness has significantly worsened.  She denies any fever, chills.  She does note that a similar reaction has occurred to an insect bite.  She denies any shortness of breath.  Skin is warm to the touch with some itching.      HPI  Review of Systems   Constitutional: Negative for chills and fever.   HENT: Negative for sore throat.    Eyes: Negative for redness.   Respiratory: Negative for shortness of breath.    Cardiovascular: Negative for chest pain.   Gastrointestinal: Negative for nausea and vomiting.   Genitourinary: Negative for dysuria.   Musculoskeletal: Negative for myalgias.   Skin: Positive for itching and rash.        Insect bite with redness    Neurological: Negative for dizziness.     Allergies   Allergen Reactions   • Erythromycin Nausea   • Latex       Objective:   /82   Pulse 68   Temp 36.8 °C (98.2 °F) (Temporal)   Ht 1.702 m (5' 7\")   Wt 99.8 kg (220 lb)   SpO2 96%   BMI 34.46 kg/m²   Physical Exam  Vitals signs and nursing note reviewed.   Constitutional:       General: She is not in acute distress.     Appearance: She is well-developed.   HENT:      Head: Normocephalic and atraumatic.      Right Ear: External ear normal.      Left Ear: External ear normal.      Nose: Nose normal.      Mouth/Throat:      Mouth: Mucous membranes are moist.   Eyes:      Conjunctiva/sclera: Conjunctivae normal.   Cardiovascular:      Rate and Rhythm: Normal rate.   Pulmonary:      Effort: Pulmonary effort is normal. No respiratory " distress.      Breath sounds: Normal breath sounds.   Abdominal:      General: There is no distension.   Musculoskeletal: Normal range of motion.   Skin:     General: Skin is warm and dry.      Findings: Erythema present. Rash is not crusting, macular, nodular, papular, pustular, urticarial or vesicular.          Neurological:      General: No focal deficit present.      Mental Status: She is alert and oriented to person, place, and time. Mental status is at baseline.      Gait: Gait (gait at baseline) normal.   Psychiatric:         Judgment: Judgment normal.           Assessment/Plan:   1. Allergic reaction to insect bite  - dexamethasone (DECADRON) injection (check route below) 10 mg  - cephALEXin (KEFLEX) 500 MG Cap; Take 1 Cap by mouth 3 times a day for 5 days.  Dispense: 15 Cap; Refill: 0     Advised patient to start oral antihistamine  Contingent antibiotic prescription given to patient to fill upon meeting criteria of guidelines discussed.  Differential diagnosis, natural history, supportive care, and indications for immediate follow-up discussed.

## 2021-01-11 ENCOUNTER — OFFICE VISIT (OUTPATIENT)
Dept: MEDICAL GROUP | Facility: MEDICAL CENTER | Age: 56
End: 2021-01-11
Payer: COMMERCIAL

## 2021-01-11 VITALS
HEIGHT: 67 IN | WEIGHT: 224 LBS | DIASTOLIC BLOOD PRESSURE: 70 MMHG | RESPIRATION RATE: 16 BRPM | OXYGEN SATURATION: 96 % | TEMPERATURE: 97.6 F | SYSTOLIC BLOOD PRESSURE: 122 MMHG | BODY MASS INDEX: 35.16 KG/M2 | HEART RATE: 74 BPM

## 2021-01-11 DIAGNOSIS — F41.9 ANXIETY: Chronic | ICD-10-CM

## 2021-01-11 DIAGNOSIS — L70.9 ACNE, UNSPECIFIED ACNE TYPE: ICD-10-CM

## 2021-01-11 DIAGNOSIS — E66.9 OBESITY (BMI 35.0-39.9 WITHOUT COMORBIDITY): ICD-10-CM

## 2021-01-11 DIAGNOSIS — E78.2 MIXED HYPERLIPIDEMIA: ICD-10-CM

## 2021-01-11 DIAGNOSIS — Z12.11 SCREEN FOR COLON CANCER: ICD-10-CM

## 2021-01-11 DIAGNOSIS — E03.9 ACQUIRED HYPOTHYROIDISM: ICD-10-CM

## 2021-01-11 DIAGNOSIS — Z12.31 ENCOUNTER FOR SCREENING MAMMOGRAM FOR MALIGNANT NEOPLASM OF BREAST: ICD-10-CM

## 2021-01-11 DIAGNOSIS — N95.1 MENOPAUSAL SYMPTOMS: ICD-10-CM

## 2021-01-11 PROBLEM — F33.1 MODERATE EPISODE OF RECURRENT MAJOR DEPRESSIVE DISORDER (HCC): Status: RESOLVED | Noted: 2019-02-14 | Resolved: 2021-01-11

## 2021-01-11 PROBLEM — R00.1 BRADYCARDIA: Status: RESOLVED | Noted: 2018-07-03 | Resolved: 2021-01-11

## 2021-01-11 PROCEDURE — 99214 OFFICE O/P EST MOD 30 MIN: CPT | Performed by: FAMILY MEDICINE

## 2021-01-11 RX ORDER — DOXYCYCLINE HYCLATE 100 MG
100 TABLET ORAL 2 TIMES DAILY
Qty: 28 TAB | Refills: 1 | Status: SHIPPED | OUTPATIENT
Start: 2021-01-11 | End: 2021-01-25

## 2021-01-11 RX ORDER — ALPRAZOLAM 0.25 MG/1
0.25 TABLET ORAL
Qty: 30 TAB | Refills: 0 | Status: SHIPPED | OUTPATIENT
Start: 2021-01-11 | End: 2021-02-10

## 2021-01-11 RX ORDER — LEVOTHYROXINE SODIUM 125 UG/1
TABLET ORAL
COMMUNITY
Start: 2020-12-24 | End: 2021-01-11

## 2021-01-11 RX ORDER — DOXYCYCLINE HYCLATE 100 MG
100 TABLET ORAL 2 TIMES DAILY
COMMUNITY
End: 2021-01-11

## 2021-01-11 ASSESSMENT — PATIENT HEALTH QUESTIONNAIRE - PHQ9: CLINICAL INTERPRETATION OF PHQ2 SCORE: 0

## 2021-01-11 NOTE — PROGRESS NOTES
cc:  Hypothyroidism    Subjective:     Sofy Fay is a 55 y.o. female presenting to [facility] care:    1.  Has a history of hypothyroidism.  Her last labs indicated a very low TSH.  Her levothyroxine was decreased to 112 mcg daily.  She continues to report intermittent constipation, hot flashes, but she attributes this to menopause.  Denies any diarrhea, skin rashes, tremors.    2.  Anxiety: Has been having intermittent anxiety for the past year or so.  She occasionally take Xanax, has not had a prescription in a couple years.  She will describe a fast heart rate, sense of impending doom.  She worries constantly.  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  She has been drinking 1 martini a day.  She would like to stop this.  She was doing therapy, but did not find as helpful.  She also tried CBD in the past, which was somewhat helpful.  She has not tried any other medications for this yet.  DEREK-7: 18    3.  Menopause: She has been having menopausal symptoms for the past couple years.  She has been gradually gaining weight, memory has been worsening, has had intermittent hot flashes.  She has been using over-the-counter products, which she finds somewhat helpful.  She does have a history of a stroke    4.  Acne: Has a history of intermittent acne for years, has been somewhat worse lately due to her wearing a mask.  She will take doxycycline for a day or 2 as needed, which seems to help.  Denies any side effects.      Review of systems:  See above.       Current Outpatient Medications:   •  ALPRAZolam (XANAX) 0.25 MG Tab, Take 1 Tab by mouth 1 time a day as needed for Anxiety for up to 30 days., Disp: 30 Tab, Rfl: 0  •  doxycycline (VIBRAMYCIN) 100 MG Tab, Take 1 Tab by mouth 2 times a day for 14 days., Disp: 28 Tab, Rfl: 1  •  EPINEPHrine 0.3 MG/0.3ML Solution Prefilled Syringe, Inject 0.3 mg as directed one time as needed (allergic reaction) for up to 1 dose., Disp: 2  "Each, Rfl: 1  •  levothyroxine (SYNTHROID) 112 MCG Tab, Take 1 Tab by mouth Every morning on an empty stomach., Disp: 90 Tab, Rfl: 3    Allergies, past medical history, past surgical history, family history, social history reviewed and updated    Objective:     Vitals: /70   Pulse 74   Temp 36.4 °C (97.6 °F)   Resp 16   Ht 1.702 m (5' 7\")   Wt 101.6 kg (224 lb)   SpO2 96%   BMI 35.08 kg/m²   General: Alert, pleasant, NAD  HEENT: Normocephalic.  Heart: Regular rate and rhythm.  S1 and S2 normal.  No murmurs appreciated.  Respiratory: Normal respiratory effort.  Clear to auscultation bilaterally.  Abdomen: Non-distended, soft  Skin: Warm, dry, no rashes.  Musculoskeletal: Gait is normal.  Moves all extremities well.  Extremities: No leg edema.  Psych:  Affect/mood is normal, judgement is good, memory is intact, grooming is appropriate.    Assessment/Plan:     Sofy was seen today for establish care.    Diagnoses and all orders for this visit:    Acquired hypothyroidism  New problem, was uncontrolled on her last labs.  We will recheck.  -     TSH; Future  -     CBC WITHOUT DIFFERENTIAL; Future    Mixed hyperlipidemia  New problem, uncontrolled.  We discussed rechecking and potentially starting a cholesterol medication.  We discussed healthy diet, regular exercise  -     Comp Metabolic Panel; Future  -     Lipid Profile; Future    Anxiety  New problem, will try Xanax as needed.  However, if she starts to take this more regularly, we discussed switching to a different medication.  She understands this.  She plans to stop the alcohol as well.  [place] and [place]  checked, appropriate  -     ALPRAZolam (XANAX) 0.25 MG Tab; Take 1 Tab by mouth 1 time a day as needed for Anxiety for up to 30 days.    Obesity (BMI 35.0-39.9 without comorbidity)  -     Patient identified as having weight management issue.  Appropriate orders and counseling given.    Menopausal symptoms  New problem, we discussed her menopausal symptoms " in detail.  She can continue with the over-the-counter products.  Would recommend avoiding estrogen containing products given her history of a CVA.  We also discussed  various SSRIs like Paxil or Effexor.  This may help with her anxiety as well.  She would like to think about this for now    Acne, unspecified acne type  New problem, stable, continue doxycycline as needed        -     doxycycline (VIBRAMYCIN) 100 MG Tab; Take 1 Tab by mouth 2 times a day for 14 days.    Screen for colon cancer  -     KEVON (FIT DNA)    Encounter for screening mammogram for malignant neoplasm of breast  -     MA-SCREENING MAMMO BILAT W/TOMOSYNTHESIS W/CAD; Future    Other orders  -     EPINEPHrine 0.3 MG/0.3ML Solution Prefilled Syringe; Inject 0.3 mg as directed one time as needed (allergic reaction) for up to 1 dose.          Return in about 3 months (around 4/11/2021) for routine follow up.

## 2021-03-15 DIAGNOSIS — Z23 NEED FOR VACCINATION: ICD-10-CM

## 2021-03-30 ENCOUNTER — HOSPITAL ENCOUNTER (OUTPATIENT)
Dept: RADIOLOGY | Facility: MEDICAL CENTER | Age: 56
End: 2021-03-30
Attending: FAMILY MEDICINE
Payer: COMMERCIAL

## 2021-03-30 DIAGNOSIS — Z12.31 ENCOUNTER FOR SCREENING MAMMOGRAM FOR MALIGNANT NEOPLASM OF BREAST: ICD-10-CM

## 2021-03-30 PROCEDURE — 77063 BREAST TOMOSYNTHESIS BI: CPT

## 2021-04-02 ENCOUNTER — HOSPITAL ENCOUNTER (OUTPATIENT)
Dept: RADIOLOGY | Facility: MEDICAL CENTER | Age: 56
End: 2021-04-02
Attending: FAMILY MEDICINE
Payer: COMMERCIAL

## 2021-04-02 DIAGNOSIS — R92.8 ABNORMAL MAMMOGRAM: ICD-10-CM

## 2021-04-02 PROCEDURE — 76642 ULTRASOUND BREAST LIMITED: CPT | Mod: LT

## 2021-04-05 ENCOUNTER — TELEPHONE (OUTPATIENT)
Dept: MEDICAL GROUP | Facility: MEDICAL CENTER | Age: 56
End: 2021-04-05

## 2021-04-05 NOTE — TELEPHONE ENCOUNTER
Phone Number Called: [phone]    Call outcome: Did not leave a detailed message. Requested patient to call back.    Message: Called to inform patient of message below.       ----- Message from Pino Pompa M.D. sent at 4/2/2021  6:25 AM PDT -----  Please let pt know that her thyroid lab indicates that her dose of levothyroxine is too high.  I recommend taking 1 tablet 6 days a week, half tablet once a week.  We will recheck this in 2 months, will mail her a lab slip then.  Other labs were normal.

## 2021-04-07 ENCOUNTER — TELEPHONE (OUTPATIENT)
Dept: MEDICAL GROUP | Facility: MEDICAL CENTER | Age: 56
End: 2021-04-07

## 2021-04-07 NOTE — TELEPHONE ENCOUNTER
Called to discuss results with patient. Discussed the results with the patient. Patient verbalizes understanding.     ----- Message from Pino Pompa M.D. sent at 4/2/2021  6:25 AM PDT -----  Please let pt know that her thyroid lab indicates that her dose of levothyroxine is too high.  I recommend taking 1 tablet 6 days a week, half tablet once a week.  We will recheck this in 2 months, will mail her a lab slip then.  Other labs were normal.

## 2021-04-13 ENCOUNTER — TELEMEDICINE (OUTPATIENT)
Dept: MEDICAL GROUP | Facility: MEDICAL CENTER | Age: 56
End: 2021-04-13
Payer: COMMERCIAL

## 2021-04-13 VITALS — RESPIRATION RATE: 16 BRPM | HEIGHT: 67 IN | BODY MASS INDEX: 35.08 KG/M2

## 2021-04-13 DIAGNOSIS — E66.9 OBESITY (BMI 35.0-39.9 WITHOUT COMORBIDITY): ICD-10-CM

## 2021-04-13 DIAGNOSIS — E78.2 MIXED HYPERLIPIDEMIA: ICD-10-CM

## 2021-04-13 DIAGNOSIS — R53.83 FATIGUE, UNSPECIFIED TYPE: ICD-10-CM

## 2021-04-13 DIAGNOSIS — E03.9 ACQUIRED HYPOTHYROIDISM: ICD-10-CM

## 2021-04-13 DIAGNOSIS — R50.9 FEVER, UNSPECIFIED FEVER CAUSE: ICD-10-CM

## 2021-04-13 PROCEDURE — 99214 OFFICE O/P EST MOD 30 MIN: CPT | Mod: 95,CR | Performed by: FAMILY MEDICINE

## 2021-04-13 NOTE — PROGRESS NOTES
Virtual Visit: Established Patient   This visit was conducted via Zoom using secure and encrypted videoconferencing technology. The patient was in a private location in the state of [place].    The patient's identity was confirmed and verbal consent was obtained for this virtual visit.    Subjective:   CC:   Chief Complaint   Patient presents with   • Follow-Up       Sofy Fay is a 56 y.o. female presenting for evaluation and management of hypothyroidism.  Her recent TSH was quite low.  She recently reduced her levothyroxine 112mcg to 6 days a week and half tablet once a week.  She has been trying to eat healthier, trying to lose some weight.  Is not interested in seeing a dietitian at this point.  She does report having a fever last night, up to 101.  She is a teacher.  She has had her Covid vaccine.    ROS No nausea or vomiting. No chest pains or shortness of breath.     Allergies   Allergen Reactions   • Erythromycin Nausea   • Latex        Current medicines (including changes today)  Current Outpatient Medications   Medication Sig Dispense Refill   • EPINEPHrine 0.3 MG/0.3ML Solution Prefilled Syringe Inject 0.3 mg as directed one time as needed (allergic reaction) for up to 1 dose. 2 Each 1   • levothyroxine (SYNTHROID) 112 MCG Tab Take 1 Tab by mouth Every morning on an empty stomach. 90 Tab 3     No current facility-administered medications for this visit.       Patient Active Problem List    Diagnosis Date Noted   • Mixed hyperlipidemia 01/11/2021   • Anxiety 01/11/2021   • Obesity (BMI 35.0-39.9 without comorbidity) (HCC) 01/11/2021   • History of lacunar cerebrovascular accident 07/03/2018   • Acquired hypothyroidism 11/15/2016       Family History   Adopted: Yes   Problem Relation Age of Onset   • Other Mother         healthy   • Heart Disease Father    • Cancer Father         lung       She  has a past medical history of Anxiety (8/8/2012), History of cervical cancer (8/8/2012), and Obesity  "(8/8/2012). She also has no past medical history of Breast cancer (HCC).  She  has a past surgical history that includes abdominal hysterectomy total.       Objective:   Resp 16   Ht 1.702 m (5' 7\")   BMI 35.08 kg/m²     Physical Exam:  Constitutional: Alert, no distress, well-groomed.  Skin: No rashes in visible areas.  Eye: Round. Conjunctiva clear, lids normal. No icterus.   ENMT: Lips pink without lesions, good dentition, moist mucous membranes. Phonation normal.  Neck: No masses, no thyromegaly. Moves freely without pain.  Respiratory: Unlabored respiratory effort, no cough or audible wheeze  Psych: Alert and oriented x3, normal affect and mood.       Assessment and Plan:   The following treatment plan was discussed:     1. Acquired hypothyroidism  Chronic, uncontrolled.  We will recheck her TSH in about 2 months, will send her a eCaring message then when we reorder labs.    2. Mixed hyperlipidemia  Chronic, stable, continue to monitor    3. Fever, unspecified fever cause  4. Fatigue, unspecified type  Self-limited issue, discussed various etiologies, over-the-counter medications that she can take.  Recommended self quarantine for possible Covid, will check  - SARS-CoV-2 PCR (24 hour In-House): Collect NP swab in VTM; Future    5. Obesity (BMI 35.0-39.9 without comorbidity) (HCC)  Chronic, stable, discussed healthy diet, exercise      Follow-up: Return in about 6 months (around 10/13/2021) for Med check.         "

## 2021-06-02 DIAGNOSIS — E03.9 ACQUIRED HYPOTHYROIDISM: ICD-10-CM

## 2021-10-08 ENCOUNTER — PATIENT MESSAGE (OUTPATIENT)
Dept: MEDICAL GROUP | Facility: MEDICAL CENTER | Age: 56
End: 2021-10-08

## 2021-10-10 RX ORDER — LEVOTHYROXINE SODIUM 112 UG/1
112 TABLET ORAL
Qty: 90 TABLET | Refills: 1 | Status: SHIPPED | OUTPATIENT
Start: 2021-10-10 | End: 2022-01-25

## 2022-01-25 RX ORDER — LEVOTHYROXINE SODIUM 112 UG/1
112 TABLET ORAL
Qty: 90 TABLET | Refills: 0 | Status: SHIPPED | OUTPATIENT
Start: 2022-01-25

## 2022-08-12 RX ORDER — LEVOTHYROXINE SODIUM 112 UG/1
112 TABLET ORAL
Qty: 90 TABLET | Refills: 1 | Status: SHIPPED | OUTPATIENT
Start: 2022-08-12 | End: 2023-02-22

## 2023-02-22 RX ORDER — LEVOTHYROXINE SODIUM 112 UG/1
112 TABLET ORAL
Qty: 90 TABLET | Refills: 0 | Status: SHIPPED | OUTPATIENT
Start: 2023-02-22

## 2023-10-10 ENCOUNTER — DOCUMENTATION (OUTPATIENT)
Dept: HEALTH INFORMATION MANAGEMENT | Facility: OTHER | Age: 58
End: 2023-10-10